# Patient Record
(demographics unavailable — no encounter records)

---

## 2024-10-07 NOTE — HISTORY OF PRESENT ILLNESS
[FreeTextEntry1] : Ms. Flowers is a 39-year-old woman with no significant past medical history presenting for evaluation of palpitations.  Her sister was diagnosed with SVT several years ago and underwent ablation in [place]. She denies exertional chest pain or dyspnea. ECG shows NSR, normal axis, normal intervals, no ischemic changes.  TTE 9/23/24: EF 59%, no significant valvular disease Event monitor results are pending.  Labs: - Hgb 13.0, Plt 122 - Cr 0.59, K 4.9, normal transaminases

## 2024-10-07 NOTE — ASSESSMENT
[FreeTextEntry1] : Ms. Flowers is a 39-year-old woman with no significant past medical history presenting for evaluation of palpitations.  Impression: (1) Palpitations with exertion (2) Family history of SVT for her sister, s/p ablation in [place]  Plan; - Event monitor results pending - TTE is normal - Check ECG stress to evaluate for exercise induced arrhythmia - Discussed her diagnosis of mild thrombocytopenia; patient will discuss this further with her PCP.  RTC after ECG stress